# Patient Record
Sex: MALE | NOT HISPANIC OR LATINO | URBAN - METROPOLITAN AREA
[De-identification: names, ages, dates, MRNs, and addresses within clinical notes are randomized per-mention and may not be internally consistent; named-entity substitution may affect disease eponyms.]

---

## 2017-05-28 ENCOUNTER — EMERGENCY (EMERGENCY)
Facility: HOSPITAL | Age: 37
LOS: 1 days | Discharge: HOME | End: 2017-05-28
Attending: EMERGENCY MEDICINE | Admitting: EMERGENCY MEDICINE
Payer: COMMERCIAL

## 2017-05-28 VITALS
TEMPERATURE: 99 F | DIASTOLIC BLOOD PRESSURE: 84 MMHG | HEART RATE: 87 BPM | SYSTOLIC BLOOD PRESSURE: 141 MMHG | RESPIRATION RATE: 15 BRPM | OXYGEN SATURATION: 99 %

## 2017-05-28 PROCEDURE — 99282 EMERGENCY DEPT VISIT SF MDM: CPT

## 2017-05-28 RX ORDER — OXYMETAZOLINE HYDROCHLORIDE 0.5 MG/ML
2 SPRAY NASAL
Qty: 1 | Refills: 0 | OUTPATIENT
Start: 2017-05-28 | End: 2017-05-31

## 2017-05-28 RX ORDER — FLUTICASONE PROPIONATE 50 MCG
1 SPRAY, SUSPENSION NASAL
Qty: 1 | Refills: 0 | OUTPATIENT
Start: 2017-05-28 | End: 2017-06-02

## 2017-05-28 NOTE — ED ADULT TRIAGE NOTE - CHIEF COMPLAINT QUOTE
Pt c/o pain on "bridge of nose" for past 2 days.  Pt denies trauma; no deformity noted.  Pt reports "think boogers forming past 3-4 days." Pt c/o pain on "bridge of nose" for past 2 days.  Pt denies trauma; no deformity noted.  Pt reports "thick boogers forming past 3-4 days."

## 2017-09-25 ENCOUNTER — EMERGENCY (EMERGENCY)
Facility: HOSPITAL | Age: 37
LOS: 1 days | Discharge: HOME | End: 2017-09-25
Admitting: EMERGENCY MEDICINE
Payer: COMMERCIAL

## 2017-09-25 VITALS
OXYGEN SATURATION: 100 % | RESPIRATION RATE: 16 BRPM | DIASTOLIC BLOOD PRESSURE: 74 MMHG | TEMPERATURE: 98 F | HEART RATE: 77 BPM | SYSTOLIC BLOOD PRESSURE: 111 MMHG

## 2017-09-25 PROCEDURE — 99053 MED SERV 10PM-8AM 24 HR FAC: CPT

## 2017-09-25 PROCEDURE — 99284 EMERGENCY DEPT VISIT MOD MDM: CPT | Mod: 25

## 2017-09-25 NOTE — ED ADULT TRIAGE NOTE - CHIEF COMPLAINT QUOTE
pt comes with complaints of psoriasis to R lower extremity which has been irritated and swelling for approx two days

## 2017-09-26 PROCEDURE — 93971 EXTREMITY STUDY: CPT | Mod: 26,LT

## 2017-09-26 NOTE — ED PROVIDER NOTE - OBJECTIVE STATEMENT
38 y/o male hx recent diagnosis of psoriasis presents to ED c/o right leg pain and swelling x 2 days. pt. states recently diagnosed with podiatrist 2 months ago after strep throat infection - developed plaques/rash to bl legs - has not been able to follow up with derm. 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 has not tried anything creams or meds for rash. denies fever chills numbness tingling weakness dizziness.

## 2017-09-26 NOTE — ED PROVIDER NOTE - SKIN RASH DESCRIPTION
bl lower extremities: + erythematous plaques greater on right leg with swelling and tenderness to palpation. dp 2+ bl. sensations intact.

## 2017-09-28 PROBLEM — Z00.00 ENCOUNTER FOR PREVENTIVE HEALTH EXAMINATION: Status: ACTIVE | Noted: 2017-09-28

## 2017-10-03 ENCOUNTER — APPOINTMENT (OUTPATIENT)
Dept: DERMATOLOGY | Facility: CLINIC | Age: 37
End: 2017-10-03

## 2023-01-01 NOTE — ED PROVIDER NOTE - OBJECTIVE STATEMENT
38 yo with nasal congestion.  Feels "thick boogers" forming.  Currently undergoing therapy for post strep complications. Concerned he is developing cellulitis.  No fevers or HA.
English